# Patient Record
Sex: MALE | Race: WHITE
[De-identification: names, ages, dates, MRNs, and addresses within clinical notes are randomized per-mention and may not be internally consistent; named-entity substitution may affect disease eponyms.]

---

## 2020-06-08 ENCOUNTER — HOSPITAL ENCOUNTER (INPATIENT)
Dept: HOSPITAL 26 - MED | Age: 85
LOS: 3 days | Discharge: HOME | DRG: 70 | End: 2020-06-11
Attending: GENERAL PRACTICE | Admitting: GENERAL PRACTICE
Payer: MEDICARE

## 2020-06-08 VITALS — HEIGHT: 71 IN | WEIGHT: 184 LBS | BODY MASS INDEX: 25.76 KG/M2

## 2020-06-08 VITALS — SYSTOLIC BLOOD PRESSURE: 146 MMHG | DIASTOLIC BLOOD PRESSURE: 69 MMHG

## 2020-06-08 VITALS — DIASTOLIC BLOOD PRESSURE: 82 MMHG | SYSTOLIC BLOOD PRESSURE: 142 MMHG

## 2020-06-08 DIAGNOSIS — N17.0: ICD-10-CM

## 2020-06-08 DIAGNOSIS — J98.11: ICD-10-CM

## 2020-06-08 DIAGNOSIS — R65.11: ICD-10-CM

## 2020-06-08 DIAGNOSIS — Z98.49: ICD-10-CM

## 2020-06-08 DIAGNOSIS — E43: ICD-10-CM

## 2020-06-08 DIAGNOSIS — G93.41: Primary | ICD-10-CM

## 2020-06-08 DIAGNOSIS — E87.1: ICD-10-CM

## 2020-06-08 DIAGNOSIS — E11.9: ICD-10-CM

## 2020-06-08 DIAGNOSIS — E87.6: ICD-10-CM

## 2020-06-08 DIAGNOSIS — Z85.46: ICD-10-CM

## 2020-06-08 DIAGNOSIS — Z03.818: ICD-10-CM

## 2020-06-08 DIAGNOSIS — E87.2: ICD-10-CM

## 2020-06-08 DIAGNOSIS — D68.59: ICD-10-CM

## 2020-06-08 DIAGNOSIS — D72.819: ICD-10-CM

## 2020-06-08 DIAGNOSIS — I10: ICD-10-CM

## 2020-06-08 DIAGNOSIS — E83.39: ICD-10-CM

## 2020-06-08 LAB
ALBUMIN FLD-MCNC: 3.3 G/DL (ref 3.4–5)
ANION GAP SERPL CALCULATED.3IONS-SCNC: 14.4 MMOL/L (ref 8–16)
APPEARANCE UR: CLEAR
AST SERPL-CCNC: 25 U/L (ref 15–37)
BASOPHILS # BLD AUTO: 0 K/UL (ref 0–0.22)
BASOPHILS NFR BLD AUTO: 0.7 % (ref 0–2)
BILIRUB SERPL-MCNC: 0.7 MG/DL (ref 0–1)
BILIRUB UR QL STRIP: NEGATIVE
BUN SERPL-MCNC: 13 MG/DL (ref 7–18)
CHLORIDE SERPL-SCNC: 100 MMOL/L (ref 98–107)
CO2 SERPL-SCNC: 24.3 MMOL/L (ref 21–32)
COLOR UR: YELLOW
CREAT SERPL-MCNC: 1.7 MG/DL (ref 0.6–1.3)
EOSINOPHIL # BLD AUTO: 0 K/UL (ref 0–0.4)
EOSINOPHIL NFR BLD AUTO: 0.1 % (ref 0–4)
ERYTHROCYTE [DISTWIDTH] IN BLOOD BY AUTOMATED COUNT: 13.7 % (ref 11.6–13.7)
GFR SERPL CREATININE-BSD FRML MDRD: (no result) ML/MIN (ref 90–?)
GLUCOSE SERPL-MCNC: 182 MG/DL (ref 74–106)
GLUCOSE UR STRIP-MCNC: NEGATIVE MG/DL
HCT VFR BLD AUTO: 39.1 % (ref 36–52)
HGB BLD-MCNC: 13.4 G/DL (ref 12–18)
HGB UR QL STRIP: NEGATIVE
LDH SERPL-CCNC: 230 U/L (ref 85–227)
LEUKOCYTE ESTERASE UR QL STRIP: NEGATIVE
LIPASE SERPL-CCNC: 86 U/L (ref 73–393)
LYMPHOCYTES # BLD AUTO: 0.3 K/UL (ref 2–11.5)
LYMPHOCYTES NFR BLD AUTO: 6.8 % (ref 20.5–51.1)
MAGNESIUM SERPL-MCNC: 2.1 MG/DL (ref 1.8–2.4)
MCH RBC QN AUTO: 33 PG (ref 27–31)
MCHC RBC AUTO-ENTMCNC: 34 G/DL (ref 33–37)
MCV RBC AUTO: 96 FL (ref 80–94)
MONOCYTES # BLD AUTO: 0.9 K/UL (ref 0.8–1)
MONOCYTES NFR BLD AUTO: 18.6 % (ref 1.7–9.3)
NEUTROPHILS # BLD AUTO: 3.7 K/UL (ref 1.8–7.7)
NEUTROPHILS NFR BLD AUTO: 73.8 % (ref 42.2–75.2)
NITRITE UR QL STRIP: NEGATIVE
PH UR STRIP: 8 [PH] (ref 5–9)
PHOSPHATE SERPL-MCNC: 2.9 MG/DL (ref 2.5–4.9)
PLATELET # BLD AUTO: 118 K/UL (ref 140–450)
POTASSIUM SERPL-SCNC: 3.7 MMOL/L (ref 3.5–5.1)
PROTHROMBIN TIME: 11.3 SECS (ref 10.8–13.4)
RBC # BLD AUTO: 4.08 MIL/UL (ref 4.2–6.1)
SODIUM SERPL-SCNC: 135 MMOL/L (ref 136–145)
TSH SERPL DL<=0.05 MIU/L-ACNC: 0.34 UIU/ML (ref 0.34–3.74)
WBC # BLD AUTO: 5 K/UL (ref 4.8–10.8)

## 2020-06-08 RX ADMIN — Medication SCH DEV: at 21:12

## 2020-06-08 RX ADMIN — SODIUM CHLORIDE SCH MLS/HR: 9 INJECTION, SOLUTION INTRAVENOUS at 19:30

## 2020-06-08 NOTE — NUR
BIBA FROM HOME C/O ALOC AND GENERALIZED WEAKNESS. PER GRANDSON, PT IS ALERT TO 
HIMSELF AND FAMILY, IS UNAWARE OF SURROUNDINGS AND IS GENERALLY CONFUSED. PT 
HAS WEAKNESS TO ARMS AND LEGS-UNABLE TO KEEP EXTREMITIES LIFTED FROM BED. 
GRANDSON STATES THAT PT HAD EPISODE OF INCONTINENCE WHILE AT HOME AND WAS 
UNABLE TO STAND FROM COUCH. PT ALERT TO VERBAL RESPONSE. PUPILS RESPONSIVE. 
ABDOMEN LARGE, BOWEL SOUNDS ACTIVE.

PT RR 35 UPON ER ARRIVAL. PT ON 2 L NC AT 97%

PMH- HTN, DM 2-4 times/mo

## 2020-06-08 NOTE — NUR
# 14 FR Urinary catheter inserted utilizing sterile technique. Immediate return 
of 100 ml yellow urine noted. Urine sample collected and sent to lab. Pt 
tolerated procedure well.

## 2020-06-08 NOTE — NUR
RECEIVED REPORT FROM ER NURSE HANY VIA LANIE. PT TRANSFERRED TO [facility] BED, PT TOLERATED 
WELL, PT IS AAOX2, NO DISTRESS NOTED,IV SITES INTACT AND PATENT, SKIN INTACT, ORIENTED TO PT 
ROOM AND CALL LIGHT.SAFETY MEASURES IN PLACE, CALL LIGHT WITHIN REACH. WILL CONTINUE TO 
MONITOR.

## 2020-06-08 NOTE — NUR
PATIENT ASLEEP AND IN STABLE CONDITION. REPOSITIONED FOR COMFORT. SAFETY PRECAUTIONS IN 
PLACE. CALL LIGHT IN REACH.

## 2020-06-08 NOTE — NUR
-------------------------------------------------------------------------------

            *** Note undone in [facility] - 06/08/20 at 1821 by AMERICO ***            

-------------------------------------------------------------------------------

SPOKE WITH EUGENIO, PTS DAUGHTER, AND INFORMED HER THAT HE WILL BE STAYING 
OVERNIGHT IN ROOM 129A

## 2020-06-08 NOTE — NUR
SPOKE WITH PTS DAUGHTER EUGENIO [phone] AND GAVE UPDATE REGARDING PENDING 
HEAD CT AND ADMISSION

## 2020-06-08 NOTE — NUR
RECEIVED PATIENT IN STABLE CONDITION FROM AM SHIFT FOR CONTINUITY OF CARE. PATIENT AAOX1-2, 
ABLE TO MAKE SIMPLE NEEDS KNOWN. RESPIRATIONS EVEN, UNLABORED. SKIN WARM, DRY AND INTACT. IV 
SITE TO RIGHT AC 20G PATENT/INTACT, INFUSING FLUIDS WELL. NO C/O PAIN. NO S/S ACUTE 
DISTRESS. PATIENT ORIENTED TO STAFF, ROOM AND CALL LIGHT. SAFETY PRECAUTIONS IN PLACE. 
ISOLATION PRECAUTIONS IN PLACE. CALL LIGHT IN REACH. WILL CONTINUE TO MONITOR.

## 2020-06-08 NOTE — NUR
INCONTINENT CARE RENDERED WITH CNA AT BEDSIDE. REPOSITIONED FOR COMFORT. NO C/O PAIN. NO S/S 
ACUTE DISTRESS. SAFETY PRECAUTIONS IN PLACE. ISOLATION PRECAUTIONS IN PLACE. CALL LIGHT 
WITHIN REACH.

## 2020-06-08 NOTE — NUR
SWAB PT FOR MRSA NARES, LABELLED AND SUBMITTED TO LAB FOR TESTING. VITAL SIGNS TAKEN AND PT 
IS STABLE. WILL CONTINUE TO MONITOR.

## 2020-06-08 NOTE — NUR
Patient will be admitted to care of [facility]. Admited to TELE.  Will go to room 
129A. Belongings list completed.  Report to NYLA MANNING.

## 2020-06-08 NOTE — NUR
HELD THE PHONE TO PTS EAR WHILE HIS DAUGHTER SPOKE WITH HIM. PER DAUGHTER, HER 
FATHER IS SLIGHTLY CONFUSED AND IS NOT RESPONDING TO ALL OF HER QUESTIONS 
APPRORIATELY.

## 2020-06-08 NOTE — NUR
PER GRANDSON, PT IS USUALLY AMBULATES INDEPENDENTLY, IS ABLE TO USE THE 
RESTROOM INDEPENDENTLY, AND IS A0X4

## 2020-06-09 VITALS — DIASTOLIC BLOOD PRESSURE: 80 MMHG | SYSTOLIC BLOOD PRESSURE: 136 MMHG

## 2020-06-09 VITALS — DIASTOLIC BLOOD PRESSURE: 77 MMHG | SYSTOLIC BLOOD PRESSURE: 130 MMHG

## 2020-06-09 VITALS — SYSTOLIC BLOOD PRESSURE: 158 MMHG | DIASTOLIC BLOOD PRESSURE: 94 MMHG

## 2020-06-09 VITALS — DIASTOLIC BLOOD PRESSURE: 72 MMHG | SYSTOLIC BLOOD PRESSURE: 132 MMHG

## 2020-06-09 VITALS — DIASTOLIC BLOOD PRESSURE: 91 MMHG | SYSTOLIC BLOOD PRESSURE: 127 MMHG

## 2020-06-09 VITALS — SYSTOLIC BLOOD PRESSURE: 140 MMHG | DIASTOLIC BLOOD PRESSURE: 90 MMHG

## 2020-06-09 LAB
ALBUMIN FLD-MCNC: 2.8 G/DL (ref 3.4–5)
ANION GAP SERPL CALCULATED.3IONS-SCNC: 16.8 MMOL/L (ref 8–16)
AST SERPL-CCNC: 30 U/L (ref 15–37)
BASOPHILS # BLD AUTO: 0.1 K/UL (ref 0–0.22)
BASOPHILS NFR BLD AUTO: 1.7 % (ref 0–2)
BILIRUB SERPL-MCNC: 0.6 MG/DL (ref 0–1)
BUN SERPL-MCNC: 10 MG/DL (ref 7–18)
CHLORIDE SERPL-SCNC: 102 MMOL/L (ref 98–107)
CHOLEST/HDLC SERPL: 3.6 {RATIO} (ref 1–4.5)
CO2 SERPL-SCNC: 20.4 MMOL/L (ref 21–32)
CREAT SERPL-MCNC: 1.7 MG/DL (ref 0.6–1.3)
EOSINOPHIL # BLD AUTO: 0 K/UL (ref 0–0.4)
EOSINOPHIL NFR BLD AUTO: 0.1 % (ref 0–4)
ERYTHROCYTE [DISTWIDTH] IN BLOOD BY AUTOMATED COUNT: 13.8 % (ref 11.6–13.7)
GFR SERPL CREATININE-BSD FRML MDRD: (no result) ML/MIN (ref 90–?)
GLUCOSE SERPL-MCNC: 145 MG/DL (ref 74–106)
HCT VFR BLD AUTO: 36.7 % (ref 36–52)
HDLC SERPL-MCNC: 29 MG/DL (ref 40–60)
HGB BLD-MCNC: 12.6 G/DL (ref 12–18)
LDH SERPL-CCNC: 252 U/L (ref 85–227)
LDLC SERPL CALC-MCNC: 61 MG/DL (ref 60–100)
LYMPHOCYTES # BLD AUTO: 1.2 K/UL (ref 2–11.5)
LYMPHOCYTES NFR BLD AUTO: 32.9 % (ref 20.5–51.1)
MAGNESIUM SERPL-MCNC: 2 MG/DL (ref 1.8–2.4)
MCH RBC QN AUTO: 33 PG (ref 27–31)
MCHC RBC AUTO-ENTMCNC: 34 G/DL (ref 33–37)
MCV RBC AUTO: 95.9 FL (ref 80–94)
MONOCYTES # BLD AUTO: 0.9 K/UL (ref 0.8–1)
MONOCYTES NFR BLD AUTO: 23.6 % (ref 1.7–9.3)
NEUTROPHILS # BLD AUTO: 1.6 K/UL (ref 1.8–7.7)
NEUTROPHILS NFR BLD AUTO: 41.7 % (ref 42.2–75.2)
PHOSPHATE SERPL-MCNC: 3.3 MG/DL (ref 2.5–4.9)
PLATELET # BLD AUTO: 93 K/UL (ref 140–450)
POTASSIUM SERPL-SCNC: 3.2 MMOL/L (ref 3.5–5.1)
RBC # BLD AUTO: 3.83 MIL/UL (ref 4.2–6.1)
SODIUM SERPL-SCNC: 136 MMOL/L (ref 136–145)
TRIGL SERPL-MCNC: 68 MG/DL (ref 30–150)
WBC # BLD AUTO: 3.7 K/UL (ref 4.8–10.8)

## 2020-06-09 RX ADMIN — Medication SCH DEV: at 20:56

## 2020-06-09 RX ADMIN — Medication SCH DEV: at 16:22

## 2020-06-09 RX ADMIN — Medication SCH DEV: at 11:30

## 2020-06-09 RX ADMIN — Medication SCH DEV: at 06:37

## 2020-06-09 RX ADMIN — DEXTROSE AND SODIUM CHLORIDE SCH MLS/HR: 5; .45 INJECTION, SOLUTION INTRAVENOUS at 14:55

## 2020-06-09 RX ADMIN — ENOXAPARIN SODIUM SCH MG: 60 INJECTION SUBCUTANEOUS at 08:16

## 2020-06-09 RX ADMIN — ENOXAPARIN SODIUM SCH MG: 60 INJECTION SUBCUTANEOUS at 20:56

## 2020-06-09 RX ADMIN — SODIUM CHLORIDE SCH MLS/HR: 9 INJECTION, SOLUTION INTRAVENOUS at 04:15

## 2020-06-09 NOTE — NUR
PATIENT REPOSITIONED IN BED. INCONTINENT CARE RENDERED WITH CNA. CALL LIGHT WITHIN REACH. 
SAFETY PRECAUTIONS IN PLACE.

## 2020-06-09 NOTE — NUR
DISCHARGE PLANNING:



THIS IS AN 89 Y/O MALE PATIENT FROM HOME, WHO CAM IN DUE TO GENERALIZED WEAKNESS WITH ALOC.  
PAST MEDICAL HISTORY INCLUDE DM, HTN, PROSTATE CA.  INITIAL DIAGNOSIS OF ALOC.  CURRENT LABS 
INCLUDE WBC 3.7, H/H 12.6/36.7, NA/K 136/3.2, BUN/CREA 10/1.7 AND D DIMER 3100.   COVID TEST 
AND INF A AND B  NEGATIVE.  ON ROCEPHIN.  MRSA NARES AND BLOOD CS PENDING.  NO CONSULT AT 
THIS TIME.  FOR SWALLOW EVAL.  DC PLAN BACK TO HOME ONCE STABLE.

-------------------------------------------------------------------------------

Addendum: 06/10/20 at 1051 by Zahra Lindo 

-------------------------------------------------------------------------------

TENTATIVE DC PLAN IS FOR TOMORROW WITH H/H FOR PT-PENDING PT EVALUATION.



SPOKE TO PATIENT'S DAUGHTER EUGENIO ZAMBRANO [phone] TO DISCUSS DC PLAN AND IS IN 
AGREEMENT.  WHEN ASKED IF SHE HAS ANY PREFERENCE, SHE ANSWERED NO.  SHE REQUESTED TO HAVE DR JASMINE CALL HER FOR UPDATES.  PER DR. NYE, DR. JASMINE IS TALKING TO DR. LÓPEZ AT THE MOMENT 
AND WILL HAVE HER CALL ME BACK.

-------------------------------------------------------------------------------

Addendum: 06/10/20 at 1146 by Zahra Lindo CM

-------------------------------------------------------------------------------

SPOKE TO ISAEL PATIENT COORDINATOR AT [facility].  SHE STATED FOR [facility], IT 
HAS TO BE FROM [facility].  CONTACTED REJI GRAY OF [facility] [phone], NO ANSWER.  
PER VOICEMAIL SHE WILL BE ON VACATION FROM JUN 8 AND WILL BE BACK JUN 12TH AND TO CALL 
[phone] IF ASSISTANCE NEEDED FOR DISCHARGE PLANNING.  CONTACTED THE PROVIDED NUMBER, 
ABLE TO SPEAK TO [employer].  INFORMED HER OF THE TENTATIVE DC PLAN.  SHE STATED SHE WILL SEND 
ME OVER THEIR LIST OF CONTRACTED AGENCIES AND ONCE THEIR IS AN ACCEPTING TO LET THEM KNOW 
AND WILL PROVIDE AN AUTH.  WILL AWAIT FOR THE FAX.

-------------------------------------------------------------------------------

Addendum: 06/10/20 at 1556 by Zahra Lindo CM

-------------------------------------------------------------------------------

1320:  CONTACTED [facility] -[phone], ABLE TO SPEAK TO, PROVIDED ME WITH THE 
FAX NUMBER.  REFERRAL SENT [phone].  WILL FOLLOW UP.



CONTACTED [facility] TO FOLLOW UP REFERRAL, ABLE TO SPEAK TO [facility]UN[facility] SANDERS.  SHE 
CONFIRMED THAT THEY RECEIVED THE REFERRAL AND THEIR  IS STILL REVIEWING IT.  
SHE STATED SHE WILL CALL ME BACK IN 10-15 MINS.

-------------------------------------------------------------------------------

Addendum: 06/10/20 at 1626 by Zahra iLndo CM

-------------------------------------------------------------------------------

[facility]ING THAT [facility] ARE NOT ABLE TO ACCEPT 
PATIENT DUE TO THEY DO NOT HAVE A NURSE AT THIS TIME.



CONTACTED [employer] -[phone], ABLE TO SPEAK TO ISAEL.  SHE PROVIDED ME WITH 
THEIR FAX NUMBER [phone] TO SEND REFERRAL.   REFERRAL SENT.  WILL FOLLOW UP.



REFERRAL SENT TO [facility].  WILL FOLLOW UP.

-------------------------------------------------------------------------------

Addendum: 06/11/20 at 1016 by Zahra Lindo CM

-------------------------------------------------------------------------------

RECEIVED A CALL FROM ISAEL OF [employer] REQUESTING 2 VISITS FOR INSURANCE TO APPROVE.  
CONTACTED LUIS ENRIQUE RAMSEY, ABLE TO SPEAK TO [employer], SHE STATED THEY WILL APPROVE 2 EVALS AND 6 
VISITS.  ISAEL BOLES [facility] MADE AWARE, AND OK WITH IT.

-------------------------------------------------------------------------------

Addendum: 06/11/20 at 1324 by Zahra Lindo CM

-------------------------------------------------------------------------------

CONTACTED [facility] -[phone], ABLE TO SPEAK TO CARYN, SHE STATED TO CALL THEM BACK IN 
AN HOUR FOR THE AUTH TO GENERATE.  WILL FOLLOW UP.

-------------------------------------------------------------------------------

Addendum: 06/11/20 at 140 by Zahra Lindo CM

-------------------------------------------------------------------------------

RECEIVED A CALL FROM [facility], STATING THAT SINCE THE PATIENT IS [employer] THEY 
ARE NOT ABLE TO PROVIDE AUTH AND NEED TO FAX REQUEST [phone] AND TO CALL 
[phone] OPT 4 OPT 4, OPT 2, OPT 2 AND OPT 2.  CONTACTED THE PROVIDED NUMBER AND 
POSSIBLE COVID.  CURRENT LABS INCLUDE WBC 14.1, H/H 8.2/25.2, NA/K 141/3.8, BUN/CREA 22/1.2, 
TROP 0.102, ALB 1.9, D DIMER >5000.  COVID TEST PENDING.  ETT TO VENT, FIO2 50%, O2 SAT 
100%.  SEDATED WITH PROPOFOL.  ON LEVOPHED DRIP.  ID AND PULMO/CRITICAL CARE CONSULTS IN 
PLACE.  DC PLAN PENDING ON PATIENT'S RESPONSE TO TREATMENT.

-------------------------------------------------------------------------------

Addendum: 06/11/20 at 1411 by Zahra Lindo CM

-------------------------------------------------------------------------------

PREVIOUS DOCS WRONG ENTRY:



RECEIVED A CALL FROM [facility], STATING THAT SINCE THE PATIENT IS [employer] THEY 
ARE NOT ABLE TO PROVIDE AUTH AND NEED TO FAX REQUEST [phone] AND TO CALL 
[phone] OPT 4 OPT 4, OPT 2, OPT 2 AND OPT 2.  CONTACTED THE PROVIDED NUMBER, CALL GOT 
CUT OFF AFTER 28 MINS ON HOLD TRYING TO CONNECT TO A LIVE AGENT.

-------------------------------------------------------------------------------

Addendum: 06/11/20 at 1656 by Zahra Lindo CM

-------------------------------------------------------------------------------

LATE ENTRY:  CONTACTED [facility] AGAIN, ON HOLD FOR 30 MINS TO CONNECT TO A LIVE AGENT AND CALL 
GOT CUT OFF.  CONTACTED HAIDER BOLES [facility] TO CHECK IF THEY HAVE ANY OTHER NUMBER TO CALL.  
SHE STATED THAT IS THE ONLY NUMBER THEY HAVE HOWEVER SHE WILL TRY TO CONNECT ME.  PER 
HAIDER SHE IS UNABLE TO TRANSFER THE CALL AND WILL SEND THEM AN EMAIL WITH MY CONTACT 
INFO.



8201:  RECEIVED A CALL BACK FROM MADDIE BILLS AUTH COORDINATOR AT [facility] [employer], TO 
CONFIRM THAT THEY RECEIVED THE ORDER AND REQUEST FOR HOME HEALTH.  SHE STATED SHE WILL 
SUBMIT THE REQUEST, AND THEIR PROCESS WILL TAKE UP TO 24 HOURS.  SHE STATED THAT SHE WILL 
CALL ME BACK WITH THE AUTH.  WILL FOLLOW UP.

-------------------------------------------------------------------------------

Addendum: 06/12/20 at 0906 by Zahra Lindo CM

-------------------------------------------------------------------------------

RECEIVED A VOICE MESSAGE FROM HAIDER BOLES [facility] TO PROVIDE AUTH [id number] FOR [facility] 
HEALTH.  SHE STATED THAT SHE PROVIDED IT TO ISAEL [facility] AS WELL.



RECEIVED A VOICE MESSAGE FROM ISAEL BOLES [facility], STATING THAT THEY RECEIVED THE AUTH FROM 
[facility].

## 2020-06-09 NOTE — NUR
NOTE:



Basic Screen: 

Yes

High Risk DC Screen 

No

 Name: 

EUGENIO ZAMBRANO

[place] Tel: 

[phone]

Relationship: 

DAUGHTER

Pre-Admission Living Arrangements: 

Lives with Other

Prior ADL 

 Independent

Current Home Health Name/Tel: 

N/A

Current DME/02 Name/Tel: 

N/A

Current Hospice Name/Tel: 

N/A

Current Dialysis Name/Tel: 

N/A

Healthcare Decision Maker: 

Patient

Advance Directive 

No

Physician Orders for Life Sustaining Treatment Form 

No

Patient/Family Have Educational Needs 

No

Discipline: 

Case Mgt/Social Svcs

Tentative Discharge Plan/Destination: 

No Needs Identified

Will require assistance post discharge: 

No

 Referred to : 

No

Tentative Discharge Plan Summary: 

PATIENT IS AN 89-YEAR-OLD MALE ADMITTED FOR ALOC AND COVID R/O. PATIENT 

HAS PMHX OF DM, PROSTATE CANCER, AND DIET CONTROLLED HYPERTENSION. PATIENT 

WAS ADMITTED FROM HOME WHERE HE LIVES WITH HIS FAMILY. SW CONTACTED EUGENIO ZAMBRANO [phone] TO VERIFY DEMOGRAPHICS. PER EUGENIO, PATIENT IS 

COMPLETELY INDEPENDENT WITH ALL ADLS AND IS ALERT/ORIENTED AT BASELINE. 

EUGENIO REPORTS NO HISTORY OF MENTAL HEALTH AND SUBSTANCE ABUSE. TENTATIVE 

DISCHARGE PLAN IS FOR PATIENT IS TO RETURN HOME. NO FURTHER NEEDS 

IDENTIFIED.

 Signature: 

JYOTSNA HOLT

Date: 

Jun 9, 2020

Time: 

10:01

## 2020-06-09 NOTE — NUR
PT WAS ASSISTED TO THE BATHROOM AND HAD A BOWEL MOVEMENT AND ASSISTED BACK TO BED AND MADE 
COMFORTABLE, NO SIGN OF DISTRESS NOTED AND WILL MONITOR PT.

## 2020-06-09 NOTE — NUR
SPOKE TO DAUGHTER EUGENIO ON THE PHONE AND GAVE HER AN UPDATE ON THE STATUS OF PATIENT. SHE 
SAID SHE WILL CALL IN THE AFTERNOON AFTER THE DOCTOR MAKES HIS ROUNDS. PATIENT IS CURRENTLY 
SLEEP AND IN STABLE CONDITION. NO C/O PAIN. NO S/S ACUTE DISTRESS. CALL LIGHT WITHIN REACH. 
SAFETY PRECAUTIONS IN PLACE.

## 2020-06-09 NOTE — NUR
** ST CLARIFICATION NOTES ** PT'S ORAL-PHARYNGEAL SWALLOW APPEARS WFL C MS/THIN LIQUIDS 
CUP/STRAW OKAY, INTERMITTENT SUPERVISION BY NSG, TRAY SET-UP. NO FURTHER DYSPHAGIA F/U 
INDICATED. D/C SKILLED ST SERVICES AT THIS TIME. NSG TO MONITOR AND NOTIFY SLP OF CHANGES IN 
STATUS. RESULTS AND RECOMMENDATIONS D/W PT AND PT'S RNCLARICE. -FILOMENA NYE MA, CCC-SLP

## 2020-06-09 NOTE — NUR
PATIENT HAS BEEN SCREENED AND CATEGORIZED AS MODERATE NUTRITION RISK. PATIENT WILL BE SEEN 
WITHIN 3-5 DAYS OF ADMISSION.



06/11/20 06/13/20



PATRICIA LERMA RD

## 2020-06-09 NOTE — NUR
RECIEVED PT AAOX1 TO 2 , CAN FOLLOW SIMPLE COMMAND - POOR HISTORIAN - NEEDS RE INFORCEMENT 
FROM TIME TO TIME , NID - O2 SAT WNL . SAFETY MEASURES IN PLACE - BED ALARM ON - CALL LIGHT 
WITHIN REACH . REMINDS HIM THE USE OF CALL LIGHT . ON TELE MONITOR . DENIES ANY PAIN AT6 
THIS TIME . WILL CONT. TO MONITOR.

## 2020-06-09 NOTE — NUR
MADE ROUNDS. PATIENT ASLEEP. NO S/S ACUTE DISTRESS. CALL LIGHT IN REACH. FREQUENT ROUNDS 
MADE BY STAFF.

## 2020-06-09 NOTE — NUR
PT WAS GIVEN THE SCHEDULED AM MEDICATIONS, CRUSHED AND BEDSIDE SWALLOW EVALUATION DONE USING 
APPLE SAUCE AND WATER THROUGH A STRAW AND PT TOLERATED IT. WILL MONITOR PT.

## 2020-06-09 NOTE — NUR
RECEIVED PT FROM NIGHT SHIFT NURSETRAN, PT IS ASLEEP AND LYING ON THE BED WITH SIDE 
RAISL UP AND CALL LIGHT WITHIN REACH, PT IS ON DROPLET ISOLATION, IV LINE ON THE RT AC G. 20 
WITH NS INFUSING AT 100ML/HR, INTACT, ON ROOM AIR,  NO SIGN OF DISTRESS NOTED AND WILL 
MONITOR PT.

## 2020-06-10 VITALS — DIASTOLIC BLOOD PRESSURE: 98 MMHG | SYSTOLIC BLOOD PRESSURE: 146 MMHG

## 2020-06-10 VITALS — DIASTOLIC BLOOD PRESSURE: 90 MMHG | SYSTOLIC BLOOD PRESSURE: 140 MMHG

## 2020-06-10 VITALS — SYSTOLIC BLOOD PRESSURE: 159 MMHG | DIASTOLIC BLOOD PRESSURE: 85 MMHG

## 2020-06-10 VITALS — SYSTOLIC BLOOD PRESSURE: 142 MMHG | DIASTOLIC BLOOD PRESSURE: 92 MMHG

## 2020-06-10 VITALS — SYSTOLIC BLOOD PRESSURE: 141 MMHG | DIASTOLIC BLOOD PRESSURE: 88 MMHG

## 2020-06-10 LAB
ALBUMIN FLD-MCNC: 2.6 G/DL (ref 3.4–5)
ANION GAP SERPL CALCULATED.3IONS-SCNC: 10.3 MMOL/L (ref 8–16)
AST SERPL-CCNC: 35 U/L (ref 15–37)
BASOPHILS # BLD AUTO: 0 K/UL (ref 0–0.22)
BASOPHILS NFR BLD AUTO: 0.8 % (ref 0–2)
BILIRUB SERPL-MCNC: 0.5 MG/DL (ref 0–1)
BUN SERPL-MCNC: 11 MG/DL (ref 7–18)
CHLORIDE SERPL-SCNC: 106 MMOL/L (ref 98–107)
CO2 SERPL-SCNC: 26.3 MMOL/L (ref 21–32)
CREAT SERPL-MCNC: 1.2 MG/DL (ref 0.6–1.3)
EOSINOPHIL # BLD AUTO: 0 K/UL (ref 0–0.4)
EOSINOPHIL NFR BLD AUTO: 0.6 % (ref 0–4)
ERYTHROCYTE [DISTWIDTH] IN BLOOD BY AUTOMATED COUNT: 13.9 % (ref 11.6–13.7)
GFR SERPL CREATININE-BSD FRML MDRD: (no result) ML/MIN (ref 90–?)
GLUCOSE SERPL-MCNC: 109 MG/DL (ref 74–106)
HCT VFR BLD AUTO: 35.8 % (ref 36–52)
HGB BLD-MCNC: 12.3 G/DL (ref 12–18)
LDH SERPL-CCNC: 271 U/L (ref 85–227)
LYMPHOCYTES # BLD AUTO: 1.2 K/UL (ref 2–11.5)
LYMPHOCYTES NFR BLD AUTO: 43.3 % (ref 20.5–51.1)
MAGNESIUM SERPL-MCNC: 2.1 MG/DL (ref 1.8–2.4)
MCH RBC QN AUTO: 33 PG (ref 27–31)
MCHC RBC AUTO-ENTMCNC: 35 G/DL (ref 33–37)
MCV RBC AUTO: 96.1 FL (ref 80–94)
MONOCYTES # BLD AUTO: 0.7 K/UL (ref 0.8–1)
MONOCYTES NFR BLD AUTO: 25.5 % (ref 1.7–9.3)
NEUTROPHILS # BLD AUTO: 0.9 K/UL (ref 1.8–7.7)
NEUTROPHILS NFR BLD AUTO: 29.8 % (ref 42.2–75.2)
PHOSPHATE SERPL-MCNC: 2.4 MG/DL (ref 2.5–4.9)
PLATELET # BLD AUTO: 75 K/UL (ref 140–450)
POTASSIUM SERPL-SCNC: 3.6 MMOL/L (ref 3.5–5.1)
RBC # BLD AUTO: 3.72 MIL/UL (ref 4.2–6.1)
SODIUM SERPL-SCNC: 139 MMOL/L (ref 136–145)
WBC # BLD AUTO: 2.8 K/UL (ref 4.8–10.8)

## 2020-06-10 RX ADMIN — DEXTROSE AND SODIUM CHLORIDE SCH MLS/HR: 5; .45 INJECTION, SOLUTION INTRAVENOUS at 13:54

## 2020-06-10 RX ADMIN — Medication SCH DEV: at 16:40

## 2020-06-10 RX ADMIN — Medication SCH DEV: at 06:00

## 2020-06-10 RX ADMIN — ENOXAPARIN SODIUM SCH MG: 60 INJECTION SUBCUTANEOUS at 08:24

## 2020-06-10 RX ADMIN — Medication SCH DEV: at 21:00

## 2020-06-10 RX ADMIN — Medication SCH DEV: at 11:49

## 2020-06-10 NOTE — NUR
RECEIVED PT AAOX1 TO 2 - NID - O2 SAT WNL . FALL RISK - WILL PUT ON BED ALARM ON . RA , 
DENIES ANY PAIN . SAFETY MEASURES  IN PLACE . CALL LIGHT WITHIN REACH . PLAN OF CARE 
DISCUSSED BUT POOR UNDERSTANDING DUE TO MENTAL STATUS . IV SITE INTACT AND PATENT . WILL 
CONT. TO MONITOR.

## 2020-06-10 NOTE — NUR
EDORSED PT TO [facility] SHIFT NURSE FOR CONTINUITY OF CARE, PT IS STABLE AT THIS TIME.

## 2020-06-10 NOTE — NUR
MEDS GIVEN AT THIS TIME. MEDS WERE CRUSHED AND MIXED IN WITH APPLE SAUCE. PT TOLERATED MED 
PASS WELL.

## 2020-06-10 NOTE — NUR
TOLERATED INCENTIVE SPIROMETRY THERAPY WELL WITHOUT INCIDENT ENCOURAGED PATIENT TO US 
INCENTIVE SPIROMETRY EVERY 1-2 HOURS WHILE AWAKE

## 2020-06-10 NOTE — NUR
TRANSFER OF CARE FROM NIGHT SHIFT RN TO DAY SHIFT RN. REPORT RECEIVED FROM KERRI COLE. PT 
IS SITTING UP IN BED EATING BREAKFAST. IV IN RT AC 2OG. IVPB RUNNING D5W 1/2 NS AT 40ML/HR. 
PT O2 SAT IS 97% ON RA. PT STATED HAD BM LAST NIGHT 6/9/20. SAFETY PRECAUTIONS INITIATED. 
TODAY'S OBJECTIVE: SAFETY AND INFECTION PREVENTION.

## 2020-06-10 NOTE — NUR
PT'S BLOOD GLUCOSE WAS CHECKED AND  AND NO INSULIN COVERAGE NEEDED, ORAL MEDICATION 
WAS GIVEN , CRUSHED, TOLERATED AND WILL MONITOR PT.

## 2020-06-11 VITALS — SYSTOLIC BLOOD PRESSURE: 150 MMHG | DIASTOLIC BLOOD PRESSURE: 88 MMHG

## 2020-06-11 VITALS — DIASTOLIC BLOOD PRESSURE: 88 MMHG | SYSTOLIC BLOOD PRESSURE: 150 MMHG

## 2020-06-11 VITALS — SYSTOLIC BLOOD PRESSURE: 134 MMHG | DIASTOLIC BLOOD PRESSURE: 90 MMHG

## 2020-06-11 VITALS — SYSTOLIC BLOOD PRESSURE: 152 MMHG | DIASTOLIC BLOOD PRESSURE: 88 MMHG

## 2020-06-11 LAB
ANION GAP SERPL CALCULATED.3IONS-SCNC: 11.3 MMOL/L (ref 8–16)
BASOPHILS # BLD AUTO: 0 K/UL (ref 0–0.22)
BASOPHILS NFR BLD AUTO: 0.3 % (ref 0–2)
BUN SERPL-MCNC: 10 MG/DL (ref 7–18)
CHLORIDE SERPL-SCNC: 105 MMOL/L (ref 98–107)
CO2 SERPL-SCNC: 27.9 MMOL/L (ref 21–32)
CREAT SERPL-MCNC: 1.2 MG/DL (ref 0.6–1.3)
EOSINOPHIL # BLD AUTO: 0 K/UL (ref 0–0.4)
EOSINOPHIL NFR BLD AUTO: 0.4 % (ref 0–4)
ERYTHROCYTE [DISTWIDTH] IN BLOOD BY AUTOMATED COUNT: 13.7 % (ref 11.6–13.7)
GFR SERPL CREATININE-BSD FRML MDRD: (no result) ML/MIN (ref 90–?)
GLUCOSE SERPL-MCNC: 100 MG/DL (ref 74–106)
HCT VFR BLD AUTO: 37.7 % (ref 36–52)
HGB BLD-MCNC: 12.8 G/DL (ref 12–18)
LYMPHOCYTES # BLD AUTO: 1.9 K/UL (ref 2–11.5)
LYMPHOCYTES NFR BLD AUTO: 46.2 % (ref 20.5–51.1)
MAGNESIUM SERPL-MCNC: 2.1 MG/DL (ref 1.8–2.4)
MCH RBC QN AUTO: 33 PG (ref 27–31)
MCHC RBC AUTO-ENTMCNC: 34 G/DL (ref 33–37)
MCV RBC AUTO: 96.4 FL (ref 80–94)
MONOCYTES # BLD AUTO: 1 K/UL (ref 0.8–1)
MONOCYTES NFR BLD AUTO: 23.4 % (ref 1.7–9.3)
NEUTROPHILS # BLD AUTO: 1.2 K/UL (ref 1.8–7.7)
NEUTROPHILS NFR BLD AUTO: 29.7 % (ref 42.2–75.2)
PHOSPHATE SERPL-MCNC: 2.4 MG/DL (ref 2.5–4.9)
PLATELET # BLD AUTO: 88 K/UL (ref 140–450)
POTASSIUM SERPL-SCNC: 3.2 MMOL/L (ref 3.5–5.1)
RBC # BLD AUTO: 3.91 MIL/UL (ref 4.2–6.1)
SODIUM SERPL-SCNC: 141 MMOL/L (ref 136–145)
WBC # BLD AUTO: 4.2 K/UL (ref 4.8–10.8)

## 2020-06-11 RX ADMIN — Medication SCH DEV: at 07:30

## 2020-06-11 NOTE — NUR
ADMINISTERED MEDICATIONS AND PATIENT TOLERATED WELL WITH NO SIGNS OF DISTRESS. BREAKFAST IS 
AT BEDSIDE. PT STATES THAT HE WANTS TO GO HOME. SAFETY MEASURES IN PLACE AND WILL CONTINUE 
TO MONITOR.

## 2020-06-11 NOTE — NUR
PT HAS BEEN DISCHARGED TO HOME, ESCORTED OFF OF UNIT VIA GURNEY. RESPIRATIONS ARE CLEAR AND 
UNLABORED ON ROOM AIR WITH NO SIGNS OF DISTRESS. VITAL SIGNS ARE WITHIN NORMAL RANGE. SKIN 
IS INTACT, IV HAS BEEN TAKEN OUT WITH LUMEN INTACT AND MINIMAL BLOOD LOSS. ID BANDS HAVE 
BEEN REMOVED, BELONGINGS WERE TAKEN WITH PATIENT. DISCHARGE TEACHING, MEDICATIONS ADHERENCE, 
AND MD FOLLOW UP HAS BEEN EXPLAINED TO DAUGHTER. DAUGHTER VERBALIZED UNDERSTANDING AND HAD 
NO FURTHER QUESTIONS.

## 2020-06-11 NOTE — NUR
PT IS CURRENTLY GETTING READY FOR DISCHARGE. DAUGHTER HAS BEEN INFORMED AND WILL COME TO 
 HER FATHER. SAFETY MEASURES IN PLACE AND WILL CONTINUE TO MONITOR.

## 2020-06-11 NOTE — NUR
ASSESS SL / IV SITE  - FLUSH W/ NSS  W/ RESISTANCE - PT . WITHDRAW HIS ARM WHEN I'M TRYING 
TO FLUSH THE SL - WILL ENDORSE - PER AM NURSE YESTERDAY SAID THE PLAN OF CARE IS POSSIBLE 
HOME TODAY . AND THE DAUGTHER KNOWS HER FATHER POSSIBLE HOME TODAY .

## 2020-06-11 NOTE — NUR
RECEIVED REPORT FROM NIGHT SHIFT NURSE. PT IS CURRENTLY LAYING IN BED ASLEEP WITH NO NO 
SIGNS OF DISTRESS NOTED. PT IS ALERT ORIENTATED TO PERSON AND PLACE. RESPIRATIONS ARE EVEN 
AND UNLABORED ON ROOM AIR. SKIN IS INTACT WITH IV PATENT, ASYMPTOMATIC, AND INFUSING AS PER 
ORDER. SAFETY MEASURES IN PLACE, BED IS IN LOW SEMI-FOWLERS POSITION, CALL LIGHT WITHIN 
REACH AND WILL CONTINUE TO MONITOR.

## 2020-06-11 NOTE — NUR
ENDORSED  TO AM SHIFT - PT - STABLE , WILL ASK TO JUDIT IF PT HAVE TO RE INSERT NEW IV - FOR 
POSSIBLE HOME TODAY .

-------------------------------------------------------------------------------

Addendum: 06/11/20 at 0747 by Nichol Medina RN

-------------------------------------------------------------------------------

INFORM AM NURSE  - THE RESIDENTS ARE IN CONFERENCE - JUST FOLLOW UP IF FOR  RE INSERTION OF 
IV CANULLA  - JUST WAIT THE DISPOSITION OF DR. GARNER AFTER THE DOCTORS' ROUNDS .